# Patient Record
Sex: FEMALE | Race: WHITE | URBAN - METROPOLITAN AREA
[De-identification: names, ages, dates, MRNs, and addresses within clinical notes are randomized per-mention and may not be internally consistent; named-entity substitution may affect disease eponyms.]

---

## 2017-01-06 ENCOUNTER — THERAPY VISIT (OUTPATIENT)
Dept: SLEEP MEDICINE | Facility: CLINIC | Age: 52
End: 2017-01-06
Payer: COMMERCIAL

## 2017-01-06 DIAGNOSIS — G47.33 OSA (OBSTRUCTIVE SLEEP APNEA): ICD-10-CM

## 2017-01-06 PROCEDURE — 95811 POLYSOM 6/>YRS CPAP 4/> PARM: CPT | Performed by: INTERNAL MEDICINE

## 2017-01-07 NOTE — PROGRESS NOTES
Pt arrived [facility]      Completed a split night PSG per provider order.    Preliminary AHI 62.  A final therapeutic PAP pressure was achieved.    Supine REM was seen on therapeutic pressure.    Patient reports feeling refreshed in AM.

## 2017-01-10 PROBLEM — G47.33 OSA (OBSTRUCTIVE SLEEP APNEA): Status: ACTIVE | Noted: 2017-01-10

## 2017-01-10 NOTE — PROCEDURES
"SLEEP STUDY INTERPRETATION  SPLIT-NIGHT STUDY      Patient: Carlotta Miranda  YOB: 1965  Study Date: 1/06/2017  MRN: [id number]  Referring Provider: Abdoulaye Zuh MD  Ordering Provider: Rakan Eisenberg MD    Indications for Polysomnography: The patient is a 51 y old Female who is 5' 5\" and weighs 260.0 lbs.  Her BMI is 43.3, [place] sleepiness scale is 17.0 and neck size is 39.0.  Relevant medical history includes menopause s/p hysterectomy without oophorectomy and morbid obesity.  A diagnostic polysomnogram was performed to evaluate for MOLLY.  After 160.5 minutes of sleep time the patient exhibited sufficient respiratory events qualifying her for a CPAP trial which was then initiated.      Polysomnogram Data:  A full night polysomnogram recorded the standard physiologic parameters including EEG, EOG, EMG, ECG, nasal and oral airflow.  Respiratory parameters of chest and abdominal movements were recorded with respiratory inductance plethysmography.  Oxygen saturation was recorded by pulse oximetry.      Diagnostic PSG  Sleep Architecture:  with melatonin as a sleep aid, increased arousal index, and normal sleep efficiency.  The total recording time of the diagnostic portion of the study was 175.2 minutes.  The total sleep time was 160.5 minutes.  During the diagnostic portion of the study the sleep latency was normal/increased/decreased at 0.7 minutes with the use of a sleep aid.  REM latency was 64.5 minutes.  Arousal index was increased at 19.1 arousals per hour.  Sleep efficiency was normal at 91.6%.  Wake after sleep onset was 4.0 minutes.   The patient spent 6.2% of total sleep time in Stage N1, 53.6% in Stage N2, 20.9% in Stage N3 and 19.3% in REM.       Respiration: Severe obstructive sleep apnea with associated hypoxemia.    Events - During the diagnostic portion of the study, the polysomnogram revealed a presence of 29 obstructive, - central, and - mixed apneas resulting in an apnea index of " 10.8 events per hour.  There were 122 hypopneas resulting in a hypopnea index of 45.6 events per hour.  The combined apnea/hypopnea index was 56.4 events per hour.  The REM AHI was 81.3 events per hour.  The supine AHI was 54.3 events per hour.  The RERA index was - events per hour.  The RDI was 56.4 events per hour.     Snoring - was reported as moderate to loud.    Respiratory rate and pattern - was normal.    Sustained Sleep Associated Hypoventilation - Transcutaneous carbon dioxide monitoring was not used; however significant hypoventilation was not suggested by oximetry.    Sleep Associated Hypoxemia - (Greater than 5 minutes O2 sat below 89%) was present.  Baseline oxygen saturation was 89.9%. Lowest oxygen saturation was 68.4%.  Time spent less than or equal to 88% was 45.8 minutes.  Time spent less than or equal to 89% was 61.1 minutes.  56.4 - 56.4     Treatment PSG  Sleep Architecture: On PAP, normalized arousal index and sleep efficiency.  At 01:02:08 AM the patient was placed on CPAP treatment and was titrated at pressures ranging from 5 cmH2O up to 7 cmH2O.  The total recording time of the treatment portion of the study was 289.1 minutes.  The total sleep time was 250.5 minutes.  During the treatment portion of the study the sleep latency was 7.5 minutes.  REM latency was 25.0 minutes.  Arousal index was normal at 6.2 arousals per hour.  Sleep efficiency was normal at 86.7%.  Wake after sleep onset was 16.0 minutes.  The patient spent 6.6% of total sleep time in Stage N1, 52.5% in Stage N2, 9.2% in Stage N3 and 31.7% in REM.       Respiration: Optimal titration.  The final pressure was 7 cmH2O with an AHI of - events per hour.  Time in REM supine on final pressure was 9.5 minutes. This titration was considered:  - optimal (residual AHI < 5 events per hour and including REM-supine sleep at final pressure).    Movement Activity: No abnormal movements noted.    Periodic Limb Movements - There were - PLMs  recorded.    REM EMG Activity - Excessive muscle activity was not present.    Nocturnal Behavior - Abnormal sleep related behaviors were not noted.    Bruxism - None apparent.    Cardiac Summary: No arrhythmias noted.  During the diagnostic portion of the study, the average pulse rate was 69.5 bpm.  The minimum pulse rate was 60.0 bpm while the maximum pulse rate was 104.1 bpm.    During the treatment portion of the study, the average pulse rate was 65.0 bpm.  The minimum pulse rate was 55.9 bpm while the maximum pulse rate was 96.0 bpm.     Arrhythmias were not noted.    Assessment:     Normal sleep latency with melatonin as a sleep aid, increased arousal index, and normal sleep efficiency.    Severe obstructive sleep apnea with associated hypoxemia.    On PAP, normalized arousal index and sleep efficiency.    Optimal titration.    Recommendations:    Treatment of MOLLY with CPAP at 7 cmH2O or Auto-titrating PAP therapy with a range of 7 - 12 cmH2O.  Recommend clinical follow up with sleep management team, including review of compliance measures.    Advise regarding the risks of drowsy driving.    Suggest optimizing sleep schedule and avoiding sleep deprivation.    Weight management (if BMI > 30).    Cardiac Comments: Normal Sinus Rhythm  Diagnostic Codes:    Obstructive Sleep Apnea G47.33    Sleep Hypoxemia/Hypoventilation G47.36   _____________________________________   Electronically Signed By: Rakan Eisenberg MD 1/10/2017

## 2017-01-13 ENCOUNTER — TELEPHONE (OUTPATIENT)
Dept: SLEEP MEDICINE | Facility: CLINIC | Age: 52
End: 2017-01-13

## 2017-01-13 DIAGNOSIS — G47.33 OSA (OBSTRUCTIVE SLEEP APNEA): Primary | ICD-10-CM

## 2017-01-13 NOTE — TELEPHONE ENCOUNTER
Called to review Polysomnography results (severe disease and unable to get into clinic before 1/24/2017).  Severe disease, great response to CPAP during titration portion of split.  CPAP is primary option given severity.  I reviewed the diagnosis of obstructive sleep apnea with patient.  We discussed health consequences of untreated sleep apnea and benefits of treatment.  She is interested in starting treatment of MOLLY with PAP therapy.  Reviewed importance of nightly therapy for effective treatment of MOLLY, and she voiced understanding and agreement.  We also reviewed importance of using PAP during the entire sleep duration to achieve maximal benefits, but reviewed that a minimum of 4 hours per night was required.  She is confident that she can achieve these goals and is willing to start therapy as soon as possible.  1. MOLLY (obstructive sleep apnea)  - Comprehensive DME - New CPAP 7 - 12 cmH2O order.    Rakan Eisenberg MD, Sleep Physician  Jan 13, 2017

## 2017-01-16 ENCOUNTER — DOCUMENTATION ONLY (OUTPATIENT)
Dept: SLEEP MEDICINE | Facility: CLINIC | Age: 52
End: 2017-01-16
Payer: COMMERCIAL

## 2017-01-16 DIAGNOSIS — G47.33 OSA (OBSTRUCTIVE SLEEP APNEA): Primary | ICD-10-CM

## 2017-01-16 PROCEDURE — 99207 ZZC NO BILLABLE SERVICE THIS VISIT: CPT

## 2017-01-16 NOTE — PROGRESS NOTES
Patient was offered choice of vendor and chose [facility].  Patient Carlotta Miranda was set up at [place] on January 16, 2017. Patient received a Resmed AirSense 10 Auto. Pressures were set at 7-12 cm H2O.   Patient s ramp is 5 cm H2O for Auto and FLEX/EPR is EPR, 2.  Patient received a Resmed Mask name: AirFit P10  Pillow mask Size For Her, Small, heated tubing and heated humidifier.  Patient is enrolled in the STM Program and does not need to meet compliance. Patient has a follow up on 3/15/17 with Dr. Eisenberg.    Kandi Santos

## 2017-01-19 ENCOUNTER — DOCUMENTATION ONLY (OUTPATIENT)
Dept: SLEEP MEDICINE | Facility: CLINIC | Age: 52
End: 2017-01-19

## 2017-01-19 NOTE — PROGRESS NOTES
3 DAY STM VISIT    Patient contacted for 3 day STM visit  Message left for patient to return call    Current settings:  EPAP Min Auto CPAP: 7.0 (The minimum allowable pressure in cmH2O)       EPAP Max Auto CPAP: 12.0 (The maximum allowable pressure in cmH2O)       Assessment:  Patient has 3 days of use.   Action plan: Pt to have f/u 14 day  STM visit.

## 2017-01-24 NOTE — PROGRESS NOTES
Patient called back and stated she is having trouble with her mask leaking when she is on her side. We talked about 30 day mask exchange and using clips to hold the headgear. Patient will call if she continues to have problems.

## 2017-01-31 ENCOUNTER — DOCUMENTATION ONLY (OUTPATIENT)
Dept: SLEEP MEDICINE | Facility: CLINIC | Age: 52
End: 2017-01-31

## 2017-01-31 DIAGNOSIS — G47.33 OSA (OBSTRUCTIVE SLEEP APNEA): Primary | ICD-10-CM

## 2017-01-31 NOTE — PROGRESS NOTES
14 DAY STM VISIT    Subjective measures:  Pt states things are going well and has no issues or complaints.  Pt is benefiting from therapy.    Assessment: Pt meeting objective benchmarks.  Patient meeting subjective benchmarks.   Action plan: Pt to have 30 day STM visit.   Device settings:    EPAP Min Auto CPAP: 7.0 (The minimum allowable pressure in cmH2O)    EPAP Max Auto CPAP: 12.0 (The maximum allowable pressure in cmH2O)    Target IPAP (95% of Target) 14 day average (Resmed): 11.1cm H20      Objective measures: 14 day rolling measure      Compliance   (Goal >70%)  --% compliance greater than four hours rolling average 14 days: 100 %      Leak  (Goal < 24 lpm)  --95% OF Leak in litres Rolling Average 14 Days: 17.91 lpm last data upload         AHI  (Goal < 5)  --AHI Rolling Average 14 Day: 3.29  last data upload         Usage  (Goal >240)  --Time mask on face 14 day average: 434 min

## 2017-02-16 ENCOUNTER — DOCUMENTATION ONLY (OUTPATIENT)
Dept: SLEEP MEDICINE | Facility: CLINIC | Age: 52
End: 2017-02-16

## 2017-02-16 DIAGNOSIS — G47.33 OSA (OBSTRUCTIVE SLEEP APNEA): ICD-10-CM

## 2017-03-15 ENCOUNTER — OFFICE VISIT (OUTPATIENT)
Dept: SLEEP MEDICINE | Facility: CLINIC | Age: 52
End: 2017-03-15
Payer: COMMERCIAL

## 2017-03-15 VITALS
HEIGHT: 66 IN | SYSTOLIC BLOOD PRESSURE: 108 MMHG | OXYGEN SATURATION: 96 % | DIASTOLIC BLOOD PRESSURE: 71 MMHG | WEIGHT: 260 LBS | HEART RATE: 59 BPM | BODY MASS INDEX: 41.78 KG/M2

## 2017-03-15 DIAGNOSIS — G47.33 OSA (OBSTRUCTIVE SLEEP APNEA): ICD-10-CM

## 2017-03-15 PROCEDURE — 99213 OFFICE O/P EST LOW 20 MIN: CPT | Performed by: INTERNAL MEDICINE

## 2017-03-15 NOTE — MR AVS SNAPSHOT
After Visit Summary   3/15/2017    Carlotta Miranda    MRN: [id number]           Patient Information     Date Of Birth          1965        Visit Information        Provider Department      3/15/2017 12:30 PM Rakan Eisenberg MD [facility]        Today's Diagnoses     MOLLY (obstructive sleep apnea)          Care Instructions    It sounds like things are going ok, but we could make some adjustments to make things even better.  We should switch masks.  We can get you scheduled for a fitting visit on or after April 17th to exchange mask to a nasal mask (instead of the nasal pillows).  I would also like to adjust the pressure settings on your machine. I will leave the starting pressure alone but bump up the maximum pressure.    Your BMI is Body mass index is 42.61 kg/(m^2).  Weight management is a personal decision.  If you are interested in exploring weight loss strategies, the following discussion covers the approaches that may be successful. Body mass index (BMI) is one way to tell whether you are at a healthy weight, overweight, or obese. It measures your weight in relation to your height.  A BMI of 18.5 to 24.9 is in the healthy range. A person with a BMI of 25 to 29.9 is considered overweight, and someone with a BMI of 30 or greater is considered obese. More than two-thirds of American adults are considered overweight or obese.  Being overweight or obese increases the risk for further weight gain. Excess weight may lead to heart disease and diabetes.  Creating and following plans for healthy eating and physical activity may help you improve your health.  Weight control is part of healthy lifestyle and includes exercise, emotional health, and healthy eating habits. Careful eating habits lifelong are the mainstay of weight control. Though there are significant health benefits from weight loss, long-term weight loss with diet alone may be very difficult to achieve- studies  show long-term success with dietary management in less than 10% of people. Attaining a healthy weight may be especially difficult to achieve in those with severe obesity. In some cases, medications, devices and surgical management might be considered.  What can you do?  If you are overweight or obese and are interested in methods for weight loss, you should discuss this with your provider.     Consider reducing daily calorie intake by 500 calories.     Keep a food journal.     Avoiding skipping meals, consider cutting portions instead.    Diet combined with exercise helps maintain muscle while optimizing fat loss. Strength training is particularly important for building and maintaining muscle mass. Exercise helps reduce stress, increase energy, and improves fitness. Increasing exercise without diet control, however, may not burn enough calories to loose weight.       Start walking three days a week 10-20 minutes at a time    Work towards walking thirty minutes five days a week     Eventually, increase the speed of your walking for 1-2 minutes at time    In addition, we recommend that you review healthy lifestyles and methods for weight loss available through the National Institutes of Health patient information sites:  http://win.niddk.nih.gov/publications/index.htm    And look into health and wellness programs that may be available through your health insurance provider, employer, local community center, or jose club.    Weight management plan: Patient was referred to their PCP to discuss a diet and exercise plan.            Follow-ups after your visit        Follow-up notes from your care team     Return in about 1 year (around 3/15/2018) for PAP Compliance Check.      Your next 10 appointments already scheduled     Mar 17, 2017  3:00 PM CDT   New Visit with Ángel Myers MD   [facility] [place] [facility] [place]    [address]  [place] [address]   [phone]            Apr 18,  "2017  3:00 PM CDT   SLEEP DME MASK FITTING with [facility]   [facility] [place]    [address] [address] [address] [phone]              Who to contact     If you have questions or need follow up information about today's clinic visit or your schedule please contact [facility] directly at [phone].  Normal or non-critical lab and imaging results will be communicated to you by [employer], letter or phone within 4 business days after the clinic has received the results. If you do not hear from us within 7 days, please contact the clinic through [employer] or phone. If you have a critical or abnormal lab result, we will notify you by phone as soon as possible.  Submit refill requests through [employer] or call your pharmacy and they will forward the refill request to us. Please allow 3 business days for your refill to be completed.          Additional Information About Your Visit        [employer] Information     [employer] gives you secure access to your electronic health record. If you see a primary care provider, you can also send messages to your care team and make appointments. If you have questions, please call your primary care clinic.  If you do not have a primary care provider, please call [phone] and they will assist you.        Care EveryWhere ID     This is your Care EveryWhere ID. This could be used by other organizations to access your [facility] medical records  [id number]        Your Vitals Were     Pulse Height Pulse Oximetry BMI (Body Mass Index)          59 1.664 m (5' 5.5\") 96% 42.61 kg/m2         Blood Pressure from Last 3 Encounters:   03/15/17 108/71   12/28/16 124/81   12/08/16 137/83    Weight from Last 3 Encounters:   03/15/17 117.9 kg (260 lb)   12/28/16 117.9 kg (260 lb)   12/08/16 117.8 kg (259 lb 11.2 oz)              We Performed the Following     Comprehensive DME        Primary Care Provider " Office Phone # Fax #    Brain Lawson [phone]       [facility] [address]  [facility] [address]        Thank you!     Thank you for choosing [facility]  for your care. Our goal is always to provide you with excellent care. Hearing back from our patients is one way we can continue to improve our services. Please take a few minutes to complete the written survey that you may receive in the mail after your visit with us. Thank you!             Your Updated Medication List - Protect others around you: Learn how to safely use, store and throw away your medicines at www.disposemymeds.org.          This list is accurate as of: 3/15/17  1:01 PM.  Always use your most recent med list.                   Brand Name Dispense Instructions for use    albuterol 108 (90 BASE) MCG/ACT Inhaler    PROAIR HFA/PROVENTIL HFA/VENTOLIN HFA    1 Inhaler    Inhale 2 puffs into the lungs every 4 hours as needed for asthma symptoms.       fexofenadine 180 MG tablet    ALLEGRA    90 tablet    Take 1 tablet (180 mg) by mouth daily       fluticasone 50 MCG/ACT spray    FLONASE    16 g    Spray 1-2 sprays into both nostrils daily       order for DME      Equipment ordered: RESMED Auto PAP Mask type: Nasal Settings: 7-12 cm H2O

## 2017-03-15 NOTE — PROGRESS NOTES
Sleep Study Follow-Up Visit:    Date on this visit: 3/15/2017    Carlotta Miranda comes in today for follow-up of her sleep study done on 1/6/2017 at the [facility] for possible sleep apnea.    Reviewed graphical summary of Polysomnography (previously reviewed on phone call in January).    Overall, the patient rates their experience with PAP as 8 (0 poor, 10 great). The mask is not comfortable. The mask is not leaking, 0 nights per week. They are not snoring with the mask on. They are having gasp arousals.  They are not having significant oral/nasal dryness. The pressure settings are comfortable.     Patient uses nasal pillows.    Bedtime is typically 9pm. Usually it takes about 60 minutes to fall asleep with the mask on. Wake time is typically 5am.  Patient is using PAP therapy 7 hours per night. The patient is usually getting 7 hours of sleep per night.    Patient does not feel rested in the morning.    [place] Sleepiness Scale: 4/24    ResMed   Auto-PAP 7-12 cmH2O download:  30 total days of use. 0 nonuse days. 100% days with >4 hours use.  Average use 7 hours 14 minutes per day. Median Leak 3.1 L/min. 95%ile Leak 15.4 L/min. CPAP 95% pressure 11.3cm. AHI 2.2        Past medical/surgical history, family history, social history, medications and allergies were reviewed.      Problem List:  Patient Active Problem List    Diagnosis Date Noted     Epiretinal membrane, mild, bilateral 10/09/2016     Priority: Medium     Posterior vitreous detachment, left 09/10/2016     Priority: Medium     Corneal guttata, mild-mod, ou 09/10/2016     Priority: Medium     Iritis, os 09/03/2016     Priority: Medium     Obesity, Class III, BMI 40-49.9 (morbid obesity) (H) 09/27/2013     Priority: Medium     MOLLY (obstructive sleep apnea) 01/10/2017     Split Night:  Sleep Study 1/06/2017 - (260.0 lbs) AHI 24.1, RDI 56.4, Supine AHI 34.5, REM AHI 26.1, Low O2% 68.4%, Time Spent ?88% 45.8, Time Spent ?89% 61.1, CPAP  was titrated to a pressure of 7 with an AHI -. Time spent in REM supine at this pressure was 9.5 minutes.       Intermittent asthma 03/09/2015     GERD (gastroesophageal reflux disease) 04/07/2014     Metacarpal bone fracture 08/28/2013     Family history of colon cancer 04/24/2012     mother ~62       Ovarian cyst 01/26/2012     CARDIOVASCULAR SCREENING; LDL GOAL LESS THAN 160 10/27/2011        Impression/Plan:  1. MOLLY (obstructive sleep apnea)  It sounds like things are going ok, but we could make some adjustments to make things even better.  We should switch masks.  We can get you scheduled for a fitting visit on or after April 17th to exchange mask to a nasal mask (instead of the nasal pillows).  I would also like to adjust the pressure settings on your machine. I will leave the starting pressure alone but bump up the maximum pressure.  - Comprehensive DME    She will follow up with me in about 1 year(s).     17 minutes spent with patient, all of which were spent face-to-face counseling, consulting, coordinating plan of care.      Rakan Eisenberg MD, Sleep Physician  Mar 15, 2017    CC: Brain Lawson

## 2017-03-15 NOTE — PATIENT INSTRUCTIONS
It sounds like things are going ok, but we could make some adjustments to make things even better.  We should switch masks.  We can get you scheduled for a fitting visit on or after April 17th to exchange mask to a nasal mask (instead of the nasal pillows).  I would also like to adjust the pressure settings on your machine. I will leave the starting pressure alone but bump up the maximum pressure.    Your BMI is Body mass index is 42.61 kg/(m^2).  Weight management is a personal decision.  If you are interested in exploring weight loss strategies, the following discussion covers the approaches that may be successful. Body mass index (BMI) is one way to tell whether you are at a healthy weight, overweight, or obese. It measures your weight in relation to your height.  A BMI of 18.5 to 24.9 is in the healthy range. A person with a BMI of 25 to 29.9 is considered overweight, and someone with a BMI of 30 or greater is considered obese. More than two-thirds of American adults are considered overweight or obese.  Being overweight or obese increases the risk for further weight gain. Excess weight may lead to heart disease and diabetes.  Creating and following plans for healthy eating and physical activity may help you improve your health.  Weight control is part of healthy lifestyle and includes exercise, emotional health, and healthy eating habits. Careful eating habits lifelong are the mainstay of weight control. Though there are significant health benefits from weight loss, long-term weight loss with diet alone may be very difficult to achieve- studies show long-term success with dietary management in less than 10% of people. Attaining a healthy weight may be especially difficult to achieve in those with severe obesity. In some cases, medications, devices and surgical management might be considered.  What can you do?  If you are overweight or obese and are interested in methods for weight loss, you should discuss this  with your provider.     Consider reducing daily calorie intake by 500 calories.     Keep a food journal.     Avoiding skipping meals, consider cutting portions instead.    Diet combined with exercise helps maintain muscle while optimizing fat loss. Strength training is particularly important for building and maintaining muscle mass. Exercise helps reduce stress, increase energy, and improves fitness. Increasing exercise without diet control, however, may not burn enough calories to loose weight.       Start walking three days a week 10-20 minutes at a time    Work towards walking thirty minutes five days a week     Eventually, increase the speed of your walking for 1-2 minutes at time    In addition, we recommend that you review healthy lifestyles and methods for weight loss available through the National Institutes of Health patient information sites:  http://win.niddk.nih.gov/publications/index.htm    And look into health and wellness programs that may be available through your health insurance provider, employer, local community center, or jose club.    Weight management plan: Patient was referred to their PCP to discuss a diet and exercise plan.

## 2017-03-17 ENCOUNTER — OFFICE VISIT (OUTPATIENT)
Dept: OPHTHALMOLOGY | Facility: CLINIC | Age: 52
End: 2017-03-17
Payer: COMMERCIAL

## 2017-03-17 DIAGNOSIS — H35.373 EPIRETINAL MEMBRANE, BILATERAL: Primary | ICD-10-CM

## 2017-03-17 DIAGNOSIS — H43.813 POSTERIOR VITREOUS DETACHMENT, BILATERAL: ICD-10-CM

## 2017-03-17 DIAGNOSIS — H18.519 CORNEAL GUTTATA: ICD-10-CM

## 2017-03-17 DIAGNOSIS — H52.4 PRESBYOPIA: ICD-10-CM

## 2017-03-17 PROCEDURE — 92015 DETERMINE REFRACTIVE STATE: CPT | Performed by: OPHTHALMOLOGY

## 2017-03-17 PROCEDURE — 92014 COMPRE OPH EXAM EST PT 1/>: CPT | Performed by: OPHTHALMOLOGY

## 2017-03-17 ASSESSMENT — CONF VISUAL FIELD
OD_NORMAL: 1
OS_NORMAL: 1

## 2017-03-17 ASSESSMENT — VISUAL ACUITY
CORRECTION_TYPE: CONTACTS
OS_CC: 10
METHOD: SNELLEN - LINEAR
OD_CC: 20/30
OD_CC: 20/25
OD_CC+: -1
OD_CC: 2
OS_CC+: -1
OD_CC: 4
OS_CC: 20/20
OS_CC: 4-
OS_CC+: -1
METHOD: SNELLEN - LINEAR
OD_CC+: -2
OS_CC: 20/25
CORRECTION_TYPE: GLASSES

## 2017-03-17 ASSESSMENT — EXTERNAL EXAM - RIGHT EYE: OD_EXAM: NORMAL

## 2017-03-17 ASSESSMENT — REFRACTION_WEARINGRX
OD_ADD: +1.75
OD_AXIS: 086
OS_SPHERE: -7.25
OD_CYLINDER: +0.50
OS_CYLINDER: +0.50
OD_SPHERE: -5.00
OS_ADD: +1.75
OS_AXIS: 049
SPECS_TYPE: PAL

## 2017-03-17 ASSESSMENT — REFRACTION_MANIFEST
OD_ADD: +2.00
OS_CYLINDER: +0.50
OD_CYLINDER: +0.75
OS_AXIS: 055
OS_SPHERE: -6.75
OS_ADD: +2.00
OD_AXIS: 078
OD_SPHERE: -4.75

## 2017-03-17 ASSESSMENT — EXTERNAL EXAM - LEFT EYE: OS_EXAM: NORMAL

## 2017-03-17 ASSESSMENT — SLIT LAMP EXAM - LIDS
COMMENTS: 1+ DERMATOCHALASIS
COMMENTS: 1+ DERMATOCHALASIS

## 2017-03-17 ASSESSMENT — CUP TO DISC RATIO
OS_RATIO: 0.2
OD_RATIO: 0.3

## 2017-03-17 ASSESSMENT — TONOMETRY
OD_IOP_MMHG: 14
IOP_METHOD: APPLANATION
OS_IOP_MMHG: 15

## 2017-03-17 NOTE — MR AVS SNAPSHOT
After Visit Summary   3/17/2017    Carlotta Miranda    MRN: [id number]           Patient Information     Date Of Birth          1965        Visit Information        Provider Department      3/17/2017 3:00 PM Ángel Myers MD [facility]        Today's Diagnoses     Epiretinal membrane, mild, bilateral    -  1    Presbyopia        Myopia, bilateral        Astigmatism, bilateral        Posterior vitreous detachment, left        Corneal guttata, mild-mod, ou          Care Instructions    Glasses Rx given -optional  Continue observation of epiretinal membrane both eyes.  Call if recurrent redness, decreased vision, ache, light sensitivity.  Call in November 2017 for an appointment in March 2018 for Complete Exam    Dr. Myers [phone]        Follow-ups after your visit        Your next 10 appointments already scheduled     Apr 18, 2017  3:00 PM CDT   SLEEP DME MASK FITTING with [facility] D[facility]   [facility] [place]    [address] [place] [id number] [phone]              Who to contact     If you have questions or need follow up information about today's clinic visit or your schedule please contact [facility] directly at [phone].  Normal or non-critical lab and imaging results will be communicated to you by MyChart, letter or phone within 4 business days after the clinic has received the results. If you do not hear from us within 7 days, please contact the clinic through [email or web address] or phone. If you have a critical or abnormal lab result, we will notify you by phone as soon as possible.  Submit refill requests through inthinc or call your pharmacy and they will forward the refill request to us. Please allow 3 business days for your refill to be completed.          Additional Information About Your Visit        [email or web address] Information     inthinc gives you secure access to your electronic  health record. If you see a primary care provider, you can also send messages to your care team and make appointments. If you have questions, please call your primary care clinic.  If you do not have a primary care provider, please call [phone] and they will assist you.        Care EveryWhere ID     This is your Care EveryWhere ID. This could be used by other organizations to access your [place] medical records  [id number]         Blood Pressure from Last 3 Encounters:   03/15/17 108/71   12/28/16 124/81   12/08/16 137/83    Weight from Last 3 Encounters:   03/15/17 117.9 kg (260 lb)   12/28/16 117.9 kg (260 lb)   12/08/16 117.8 kg (259 lb 11.2 oz)              Today, you had the following     No orders found for display       Primary Care Provider Office Phone # Fax #    Brain Lawson [phone]       [facility] [address] [facility] [address]        Thank you!     Thank you for choosing [facility]  for your care. Our goal is always to provide you with excellent care. Hearing back from our patients is one way we can continue to improve our services. Please take a few minutes to complete the written survey that you may receive in the mail after your visit with us. Thank you!             Your Updated Medication List - Protect others around you: Learn how to safely use, store and throw away your medicines at www.disposemymeds.org.          This list is accurate as of: 3/17/17  4:06 PM.  Always use your most recent med list.                   Brand Name Dispense Instructions for use    albuterol 108 (90 BASE) MCG/ACT Inhaler    PROAIR HFA/PROVENTIL HFA/VENTOLIN HFA    1 Inhaler    Inhale 2 puffs into the lungs every 4 hours as needed for asthma symptoms.       fexofenadine 180 MG tablet    ALLEGRA    90 tablet    Take 1 tablet (180 mg) by mouth daily       fluticasone 50 MCG/ACT spray    FLONASE    16 g    Spray 1-2 sprays into both nostrils daily        order for DME      Equipment ordered: [employer] Auto PAP Mask type: Nasal Settings: 7-12 cm H2O

## 2017-03-17 NOTE — PROGRESS NOTES
Current Eye Medications:  none     Subjective:  Comprehensive Eye Exam.  Vision appears to be getting worse, especially when looking in the distance.  She has glasses that she wears as a back-up, but primarily wears her contact lenses each day.  Her contact lenses are from [facility].     Both eyes are comfortable, but occasionally some itching.        Objective:  See Ophthalmology Exam.       Assessment:  Posterior vitreous detachment now both eyes; stable mild ERM both eyes.      ICD-10-CM    1. Epiretinal membrane, mild, bilateral H35.373 EYE EXAM (SIMPLE-NONBILLABLE)   2. Corneal guttata, mild-mod, ou H18.51    3. Posterior vitreous detachment, bilateral H43.813    4. Presbyopia H52.4 REFRACTIVE STATUS        Plan: Glasses Rx given -optional  Continue observation of epiretinal membrane both eyes.  Call if recurrent redness, decreased vision, ache, light sensitivity.  See Dr. Candelario anytime for contact lens evaluation.  Call in November 2017 for an appointment in March 2018 for Complete Exam and retinal OCT.    Dr. Myers [phone]

## 2017-03-17 NOTE — PATIENT INSTRUCTIONS
Glasses Rx given -optional  Continue observation of epiretinal membrane both eyes.  Call if recurrent redness, decreased vision, ache, light sensitivity.  See Dr. Candelario anytime for contact lens evaluation.  Call in November 2017 for an appointment in March 2018 for Complete Exam and retinal OCT.    Dr. Myers [phone]

## 2017-03-19 PROBLEM — H43.813 POSTERIOR VITREOUS DETACHMENT, BILATERAL: Status: ACTIVE | Noted: 2017-03-19

## 2017-04-18 ENCOUNTER — DOCUMENTATION ONLY (OUTPATIENT)
Dept: SLEEP MEDICINE | Facility: CLINIC | Age: 52
End: 2017-04-18

## 2017-04-18 DIAGNOSIS — G47.33 OSA (OBSTRUCTIVE SLEEP APNEA): ICD-10-CM

## 2017-04-18 NOTE — PROGRESS NOTES
Patient is currently using the Resmed Airfit P10 pillow mask. Per patient, recently the pillow collapsed and she was not able to get enough air. Patient was fitted for the Resmed Airfit N20 nasal mask size medium.

## 2017-04-26 ENCOUNTER — OFFICE VISIT (OUTPATIENT)
Dept: OPTOMETRY | Facility: CLINIC | Age: 52
End: 2017-04-26
Payer: COMMERCIAL

## 2017-04-26 DIAGNOSIS — H52.4 MYOPIA WITH ASTIGMATISM AND PRESBYOPIA, BILATERAL: ICD-10-CM

## 2017-04-26 DIAGNOSIS — H52.203 MYOPIA WITH ASTIGMATISM AND PRESBYOPIA, BILATERAL: ICD-10-CM

## 2017-04-26 DIAGNOSIS — H10.13 ALLERGIC CONJUNCTIVITIS OF BOTH EYES: Primary | ICD-10-CM

## 2017-04-26 DIAGNOSIS — H52.13 MYOPIA WITH ASTIGMATISM AND PRESBYOPIA, BILATERAL: ICD-10-CM

## 2017-04-26 PROCEDURE — 92310 CONTACT LENS FITTING OU: CPT | Performed by: OPTOMETRIST

## 2017-04-26 PROCEDURE — 99207 ZZC NO BILLABLE SERVICE THIS VISIT: CPT | Performed by: OPTOMETRIST

## 2017-04-26 RX ORDER — OLOPATADINE HYDROCHLORIDE 1 MG/ML
1 SOLUTION/ DROPS OPHTHALMIC 2 TIMES DAILY PRN
Qty: 5 ML | Refills: 12 | Status: SHIPPED | OUTPATIENT
Start: 2017-04-26

## 2017-04-26 ASSESSMENT — SLIT LAMP EXAM - LIDS
COMMENTS: NORMAL
COMMENTS: NORMAL

## 2017-04-26 ASSESSMENT — REFRACTION_WEARINGRX
OS_ADD: +2.00
OD_CYLINDER: +0.75
OS_SPHERE: -6.75
OS_AXIS: 055
OD_SPHERE: -4.75
OD_AXIS: 078
OD_ADD: +2.00
OS_CYLINDER: +0.50
SPECS_TYPE: PAL

## 2017-04-26 ASSESSMENT — EXTERNAL EXAM - RIGHT EYE: OD_EXAM: NORMAL

## 2017-04-26 ASSESSMENT — REFRACTION_CURRENTRX
OD_BRAND: AIR OPTIX AQUA
OD_DIAMETER: 14.2
OD_SPHERE: -2.75
OS_BASECURVE: 8.60
OS_DIAMETER: 14.2
OD_BASECURVE: 8.60
OS_ADDL_SPECS: .
OS_SPHERE: -6.00
OS_BRAND: AIR OPTIX AQUA

## 2017-04-26 ASSESSMENT — VISUAL ACUITY
OS_CC+: -2
METHOD: SNELLEN - LINEAR
OD_CC: 20/40
OS_CC: 20/20
CORRECTION_TYPE: CONTACTS

## 2017-04-26 ASSESSMENT — EXTERNAL EXAM - LEFT EYE: OS_EXAM: NORMAL

## 2017-04-26 NOTE — PROGRESS NOTES
Chief Complaint   Patient presents with     Contact Lens Fitting     waiver signed - $60 paid        Previous contact lens wearer? Yes: Air Optix Monovision fit   Comfort of contact lenses :Pt likes cl  Satisfied with current lenses: Yes        Last Eye Exam: 3/18/2017  Occupation or Hobbies: Cancer  - Metamon[employer] Linquist  [employer] [facility] [employer]       Medical, surgical and family histories reviewed and updated 4/26/2017.       OBJECTIVE: See Ophthalmology exam    ASSESSMENT:    ICD-10-CM    1. Allergic conjunctivitis of both eyes H10.13 olopatadine (PATANOL) 0.1 % ophthalmic solution   2. Myopia with astigmatism and presbyopia, bilateral H52.13 CONTACT LENS FITTING,BILAT    H52.203       PLAN:     Patient Instructions   Prescription final today. Return to clinic or call if any concerns or if we need to change power.

## 2017-04-26 NOTE — MR AVS SNAPSHOT
After Visit Summary   4/26/2017    Carlotta Miranda    MRN: [id number]           Patient Information     Date Of Birth          1965        Visit Information        Provider Department      4/26/2017 12:30 PM Sonia Zhou OD [facility]        Today's Diagnoses     Allergic conjunctivitis of both eyes    -  1    Myopia with astigmatism and presbyopia, bilateral          Care Instructions    Prescription final today. Return to clinic or call if any concerns or if we need to change power.        Follow-ups after your visit        Who to contact     If you have questions or need follow up information about today's clinic visit or your schedule please contact [facility] directly at [phone].  Normal or non-critical lab and imaging results will be communicated to you by MyChart, letter or phone within 4 business days after the clinic has received the results. If you do not hear from us within 7 days, please contact the clinic through HappyBoxhart or phone. If you have a critical or abnormal lab result, we will notify you by phone as soon as possible.  Submit refill requests through [employer]umr or call your pharmacy and they will forward the refill request to us. Please allow 3 business days for your refill to be completed.          Additional Information About Your Visit        MyChart Information     [employer] gives you secure access to your electronic health record. If you see a primary care provider, you can also send messages to your care team and make appointments. If you have questions, please call your primary care clinic.  If you do not have a primary care provider, please call [phone] and they will assist you.        Care EveryWhere ID     This is your Care EveryWhere ID. This could be used by other organizations to access your [place] medical records  [id number]         Blood Pressure from Last 3 Encounters:   03/15/17 108/71   12/28/16 124/81    12/08/16 137/83    Weight from Last 3 Encounters:   03/15/17 117.9 kg (260 lb)   12/28/16 117.9 kg (260 lb)   12/08/16 117.8 kg (259 lb 11.2 oz)              We Performed the Following     CONTACT LENS FITTING,BILAT          Today's Medication Changes          These changes are accurate as of: 4/26/17  6:55 PM.  If you have any questions, ask your nurse or doctor.               Start taking these medicines.        Dose/Directions    olopatadine 0.1 % ophthalmic solution   Commonly known as:  PATANOL   Used for:  Allergic conjunctivitis of both eyes   Started by:  Sonia Zhou, ARASELI        Dose:  1 drop   Place 1 drop into both eyes 2 times daily as needed for allergies   Quantity:  5 mL   Refills:  12            Where to get your medicines      [place] [facility] [place] [address]  [address], [facility] [address]     Phone:  [phone]     olopatadine 0.1 % ophthalmic solution                Primary Care Provider Office Phone # Fax #    Brain Lawson [phone]       [facility] [address]  [facility] [address]        Thank you!     Thank you for choosing [facility]  for your care. Our goal is always to provide you with excellent care. Hearing back from our patients is one way we can continue to improve our services. Please take a few minutes to complete the written survey that you may receive in the mail after your visit with us. Thank you!             Your Updated Medication List - Protect others around you: Learn how to safely use, store and throw away your medicines at www.disposemymeds.org.          This list is accurate as of: 4/26/17  6:55 PM.  Always use your most recent med list.                   Brand Name Dispense Instructions for use    albuterol 108 (90 BASE) MCG/ACT Inhaler    PROAIR HFA/PROVENTIL HFA/VENTOLIN HFA    1 Inhaler    Inhale 2 puffs into the lungs  every 4 hours as needed for asthma symptoms.       fexofenadine 180 MG tablet    ALLEGRA    90 tablet    Take 1 tablet (180 mg) by mouth daily       fluticasone 50 MCG/ACT spray    FLONASE    16 g    Spray 1-2 sprays into both nostrils daily       olopatadine 0.1 % ophthalmic solution    PATANOL    5 mL    Place 1 drop into both eyes 2 times daily as needed for allergies       order for DME      Equipment ordered: [employer] Auto PAP Mask type: Nasal Settings: 7-12 cm H2O

## 2017-05-26 ENCOUNTER — TELEPHONE (OUTPATIENT)
Dept: SLEEP MEDICINE | Facility: CLINIC | Age: 52
End: 2017-05-26

## 2017-05-26 DIAGNOSIS — G47.33 OSA (OBSTRUCTIVE SLEEP APNEA): ICD-10-CM

## 2017-05-26 NOTE — TELEPHONE ENCOUNTER
Patient lost one of the magnets to her Resmed Airfit N20 nasal mask. I have extra magnets and will leave a pair with the [facility]  for patient to pickup today.

## 2017-07-08 ENCOUNTER — HEALTH MAINTENANCE LETTER (OUTPATIENT)
Age: 52
End: 2017-07-08

## 2017-08-03 ENCOUNTER — DOCUMENTATION ONLY (OUTPATIENT)
Dept: SLEEP MEDICINE | Facility: CLINIC | Age: 52
End: 2017-08-03

## 2017-08-03 DIAGNOSIS — G47.33 OSA (OBSTRUCTIVE SLEEP APNEA): ICD-10-CM

## 2018-08-03 ENCOUNTER — TELEPHONE (OUTPATIENT)
Dept: SLEEP MEDICINE | Facility: CLINIC | Age: 53
End: 2018-08-03

## 2018-08-03 NOTE — TELEPHONE ENCOUNTER
Patient called requesting a 60 day download of her cpap machine be faxed to her doctor Cheli Reaves @ [facility] [phone]

## 2019-10-01 ENCOUNTER — HEALTH MAINTENANCE LETTER (OUTPATIENT)
Age: 54
End: 2019-10-01

## 2021-01-15 ENCOUNTER — HEALTH MAINTENANCE LETTER (OUTPATIENT)
Age: 56
End: 2021-01-15

## 2021-01-23 ENCOUNTER — HEALTH MAINTENANCE LETTER (OUTPATIENT)
Age: 56
End: 2021-01-23

## 2021-04-14 ENCOUNTER — TELEPHONE (OUTPATIENT)
Dept: SLEEP MEDICINE | Facility: CLINIC | Age: 56
End: 2021-04-14

## 2021-04-14 NOTE — TELEPHONE ENCOUNTER
Reason for call:  Other   Patient called regarding (reason for call):  Patient would like a call back from someone at the clinic regarding copy of sleep study records. Patient did not want to go through Medical records.   Additional comments:     Phone number to reach patient:  Cell number on file:    No relevant phone numbers on file.   [phone]    Best Time:  Anytime    Can we leave a detailed message on this number?  Not Applicable    Travel screening: Not Applicable

## 2021-04-29 NOTE — TELEPHONE ENCOUNTER
Patient calling for cpap data to be sent to her current sleep porvider. Patient was instructed to contact her DME provider [facility] for this information. Patient given contact number to [facility] for future inquiries in regards to her cpap device.     Mariaelena Fair MA

## 2021-09-04 ENCOUNTER — HEALTH MAINTENANCE LETTER (OUTPATIENT)
Age: 56
End: 2021-09-04

## 2022-02-19 ENCOUNTER — HEALTH MAINTENANCE LETTER (OUTPATIENT)
Age: 57
End: 2022-02-19

## 2022-10-16 ENCOUNTER — HEALTH MAINTENANCE LETTER (OUTPATIENT)
Age: 57
End: 2022-10-16

## 2023-04-01 ENCOUNTER — HEALTH MAINTENANCE LETTER (OUTPATIENT)
Age: 58
End: 2023-04-01

## 2024-03-23 ENCOUNTER — HEALTH MAINTENANCE LETTER (OUTPATIENT)
Age: 59
End: 2024-03-23